# Patient Record
Sex: MALE | Race: BLACK OR AFRICAN AMERICAN | NOT HISPANIC OR LATINO | URBAN - METROPOLITAN AREA
[De-identification: names, ages, dates, MRNs, and addresses within clinical notes are randomized per-mention and may not be internally consistent; named-entity substitution may affect disease eponyms.]

---

## 2017-08-19 ENCOUNTER — EMERGENCY (EMERGENCY)
Facility: HOSPITAL | Age: 52
LOS: 1 days | Discharge: HOME | End: 2017-08-19
Attending: EMERGENCY MEDICINE | Admitting: EMERGENCY MEDICINE
Payer: MEDICARE

## 2017-08-19 VITALS
TEMPERATURE: 99 F | SYSTOLIC BLOOD PRESSURE: 126 MMHG | DIASTOLIC BLOOD PRESSURE: 81 MMHG | OXYGEN SATURATION: 97 % | HEART RATE: 97 BPM | RESPIRATION RATE: 18 BRPM

## 2017-08-19 DIAGNOSIS — F10.120 ALCOHOL ABUSE WITH INTOXICATION, UNCOMPLICATED: ICD-10-CM

## 2017-08-19 DIAGNOSIS — S00.01XA ABRASION OF SCALP, INITIAL ENCOUNTER: ICD-10-CM

## 2017-08-19 DIAGNOSIS — R41.82 ALTERED MENTAL STATUS, UNSPECIFIED: ICD-10-CM

## 2017-08-19 PROCEDURE — 99284 EMERGENCY DEPT VISIT MOD MDM: CPT | Mod: 25

## 2017-08-19 PROCEDURE — 99053 MED SERV 10PM-8AM 24 HR FAC: CPT

## 2017-08-19 NOTE — ED ADULT TRIAGE NOTE - CHIEF COMPLAINT QUOTE
patient brought in by ambulance from the street after called in by bystanders after patient in a mechanical fall and hit L side of the head on plastic street barriers. No LOC, + abrasion L forehead. Admits to drinking and had been vomiting from the field.

## 2017-08-20 VITALS
OXYGEN SATURATION: 97 % | HEART RATE: 86 BPM | RESPIRATION RATE: 16 BRPM | DIASTOLIC BLOOD PRESSURE: 78 MMHG | TEMPERATURE: 99 F | SYSTOLIC BLOOD PRESSURE: 122 MMHG

## 2017-08-20 PROCEDURE — 72125 CT NECK SPINE W/O DYE: CPT | Mod: 26

## 2017-08-20 PROCEDURE — 70450 CT HEAD/BRAIN W/O DYE: CPT | Mod: 26

## 2017-08-20 NOTE — ED PROVIDER NOTE - OBJECTIVE STATEMENT
51 yo male biba for public intoxication after consuming alcohol tonight. Pt has small superficial abrasion right temple and arrives with cervical collar in place. Pt denies headache/nausea/neck pain. +AOB.

## 2017-08-20 NOTE — ED ADULT NURSE NOTE - OBJECTIVE STATEMENT
patient brought in by ambulance from the street after called in by bystanders after patient in a mechanical fall and hit L side of the head on plastic street barriers. No LOC, + abrasion L forehead. Admits to drinking and had been vomiting from the field. pt. bought in by EMS with  C-collar on. pt. able to speak complete full clear sentences.

## 2020-02-04 NOTE — ED ADULT NURSE NOTE - RESPIRATORY WDL
Patent
Breathing spontaneous and unlabored. Breath sounds clear and equal bilaterally with regular rhythm.
